# Patient Record
Sex: MALE | Race: ASIAN | NOT HISPANIC OR LATINO | Employment: FULL TIME | URBAN - METROPOLITAN AREA
[De-identification: names, ages, dates, MRNs, and addresses within clinical notes are randomized per-mention and may not be internally consistent; named-entity substitution may affect disease eponyms.]

---

## 2024-02-13 ENCOUNTER — APPOINTMENT (OUTPATIENT)
Dept: PRIMARY CARE | Facility: CLINIC | Age: 24
End: 2024-02-13

## 2024-03-13 ENCOUNTER — LAB (OUTPATIENT)
Dept: LAB | Facility: LAB | Age: 24
End: 2024-03-13
Payer: COMMERCIAL

## 2024-03-13 ENCOUNTER — OFFICE VISIT (OUTPATIENT)
Dept: PRIMARY CARE | Facility: CLINIC | Age: 24
End: 2024-03-13
Payer: COMMERCIAL

## 2024-03-13 VITALS
BODY MASS INDEX: 32.47 KG/M2 | OXYGEN SATURATION: 98 % | HEIGHT: 66 IN | HEART RATE: 78 BPM | DIASTOLIC BLOOD PRESSURE: 74 MMHG | SYSTOLIC BLOOD PRESSURE: 114 MMHG | WEIGHT: 202 LBS

## 2024-03-13 DIAGNOSIS — R19.7 DIARRHEA, UNSPECIFIED TYPE: Primary | ICD-10-CM

## 2024-03-13 DIAGNOSIS — R19.7 DIARRHEA, UNSPECIFIED TYPE: ICD-10-CM

## 2024-03-13 LAB
BASOPHILS # BLD AUTO: 0.02 X10*3/UL (ref 0–0.1)
BASOPHILS NFR BLD AUTO: 0.3 %
EOSINOPHIL # BLD AUTO: 0.1 X10*3/UL (ref 0–0.7)
EOSINOPHIL NFR BLD AUTO: 1.6 %
ERYTHROCYTE [DISTWIDTH] IN BLOOD BY AUTOMATED COUNT: 12.2 % (ref 11.5–14.5)
HCT VFR BLD AUTO: 47.6 % (ref 41–52)
HGB BLD-MCNC: 15.5 G/DL (ref 13.5–17.5)
IMM GRANULOCYTES # BLD AUTO: 0.02 X10*3/UL (ref 0–0.7)
IMM GRANULOCYTES NFR BLD AUTO: 0.3 % (ref 0–0.9)
LYMPHOCYTES # BLD AUTO: 1.83 X10*3/UL (ref 1.2–4.8)
LYMPHOCYTES NFR BLD AUTO: 28.9 %
MCH RBC QN AUTO: 27.5 PG (ref 26–34)
MCHC RBC AUTO-ENTMCNC: 32.6 G/DL (ref 32–36)
MCV RBC AUTO: 85 FL (ref 80–100)
MONOCYTES # BLD AUTO: 0.63 X10*3/UL (ref 0.1–1)
MONOCYTES NFR BLD AUTO: 10 %
NEUTROPHILS # BLD AUTO: 3.73 X10*3/UL (ref 1.2–7.7)
NEUTROPHILS NFR BLD AUTO: 58.9 %
NRBC BLD-RTO: 0 /100 WBCS (ref 0–0)
PLATELET # BLD AUTO: 283 X10*3/UL (ref 150–450)
RBC # BLD AUTO: 5.63 X10*6/UL (ref 4.5–5.9)
TSH SERPL-ACNC: 1.07 MIU/L (ref 0.44–3.98)
WBC # BLD AUTO: 6.3 X10*3/UL (ref 4.4–11.3)

## 2024-03-13 PROCEDURE — 84443 ASSAY THYROID STIM HORMONE: CPT

## 2024-03-13 PROCEDURE — 36415 COLL VENOUS BLD VENIPUNCTURE: CPT

## 2024-03-13 PROCEDURE — 80053 COMPREHEN METABOLIC PANEL: CPT

## 2024-03-13 PROCEDURE — 99214 OFFICE O/P EST MOD 30 MIN: CPT | Performed by: FAMILY MEDICINE

## 2024-03-13 PROCEDURE — 85025 COMPLETE CBC W/AUTO DIFF WBC: CPT

## 2024-03-13 PROCEDURE — 1036F TOBACCO NON-USER: CPT | Performed by: FAMILY MEDICINE

## 2024-03-13 RX ORDER — DEXTROMETHORPHAN HYDROBROMIDE, GUAIFENESIN 5; 100 MG/5ML; MG/5ML
650 LIQUID ORAL EVERY 8 HOURS PRN
COMMUNITY

## 2024-03-13 RX ORDER — DICYCLOMINE HYDROCHLORIDE 20 MG/1
20 TABLET ORAL
Qty: 20 TABLET | Refills: 1 | Status: SHIPPED | OUTPATIENT
Start: 2024-03-13 | End: 2024-03-18 | Stop reason: WASHOUT

## 2024-03-13 ASSESSMENT — PATIENT HEALTH QUESTIONNAIRE - PHQ9
SUM OF ALL RESPONSES TO PHQ9 QUESTIONS 1 AND 2: 0
2. FEELING DOWN, DEPRESSED OR HOPELESS: NOT AT ALL
1. LITTLE INTEREST OR PLEASURE IN DOING THINGS: NOT AT ALL

## 2024-03-13 ASSESSMENT — PAIN SCALES - GENERAL: PAINLEVEL: 0-NO PAIN

## 2024-03-13 ASSESSMENT — ENCOUNTER SYMPTOMS
RECTAL PAIN: 0
ANAL BLEEDING: 0
DIARRHEA: 1
VOMITING: 0
BLOOD IN STOOL: 0
ABDOMINAL PAIN: 1
ARTHRALGIAS: 0
NAUSEA: 0

## 2024-03-13 NOTE — PROGRESS NOTES
[facility]: [facility]  E/M EVALUATION    Erick Fry is a 23 y.o. male who presents for Abdominal Pain (Patient has been having a lot of bowel movements, uncontrollable, denies blood in the stool/dd).    Subjective   22 yo   Pt here for bowel concerns   stringly some times water 5-6 x per day. Thinks he has a hemorrhoid. Noticed a bulge on anus.  He states will have urgency.  No OTC.  No abd pain.  Had one episode a while back with blood on TP.      Abdominal Pain  Associated symptoms include diarrhea. Pertinent negatives include no arthralgias, nausea, rash or vomiting.     Review of Systems   Gastrointestinal:  Positive for abdominal pain and diarrhea. Negative for anal bleeding, blood in stool, nausea, rectal pain and vomiting.   Musculoskeletal:  Negative for arthralgias.   Skin:  Negative for rash.       Objective   Vitals:    03/13/24 1450   BP: 114/74   Pulse: 78   SpO2: 98%     Physical Exam  Constitutional:       Appearance: Normal appearance.   Cardiovascular:      Rate and Rhythm: Normal rate and regular rhythm.      Heart sounds: No murmur heard.  Pulmonary:      Effort: Pulmonary effort is normal.      Breath sounds: Normal breath sounds.   Abdominal:      General: Bowel sounds are normal.      Palpations: Abdomen is soft.      Tenderness: There is no abdominal tenderness.   Genitourinary:     Rectum: Normal.      Comments: NATHAN neg.   Neurological:      Mental Status: He is alert.         Assessment/Plan      There is no problem list on file for this patient.      Diagnoses and all orders for this visit:  Diarrhea, unspecified type  -     dicyclomine (Bentyl) 20 mg tablet; Take 1 tablet (20 mg) by mouth 4 times a day before meals for 10 days.  -     Comprehensive Metabolic Panel; Future  -     TSH with reflex to Free T4 if abnormal; Future  -     CBC and Auto Differential; Future      The patient was encouraged to ensure that any/all documentation is accurate and up to  date, and that our office be provided a copy in the event that anything changes.         Moe Hagen MD

## 2024-03-14 LAB
ALBUMIN SERPL BCP-MCNC: 4.9 G/DL (ref 3.4–5)
ALP SERPL-CCNC: 76 U/L (ref 33–120)
ALT SERPL W P-5'-P-CCNC: 64 U/L (ref 10–52)
ANION GAP SERPL CALC-SCNC: 14 MMOL/L (ref 10–20)
AST SERPL W P-5'-P-CCNC: 33 U/L (ref 9–39)
BILIRUB SERPL-MCNC: 0.6 MG/DL (ref 0–1.2)
BUN SERPL-MCNC: 17 MG/DL (ref 6–23)
CALCIUM SERPL-MCNC: 9.8 MG/DL (ref 8.6–10.6)
CHLORIDE SERPL-SCNC: 104 MMOL/L (ref 98–107)
CO2 SERPL-SCNC: 29 MMOL/L (ref 21–32)
CREAT SERPL-MCNC: 1.17 MG/DL (ref 0.5–1.3)
EGFRCR SERPLBLD CKD-EPI 2021: 90 ML/MIN/1.73M*2
GLUCOSE SERPL-MCNC: 84 MG/DL (ref 74–99)
POTASSIUM SERPL-SCNC: 4.7 MMOL/L (ref 3.5–5.3)
PROT SERPL-MCNC: 7.6 G/DL (ref 6.4–8.2)
SODIUM SERPL-SCNC: 142 MMOL/L (ref 136–145)

## 2024-03-18 ENCOUNTER — TELEPHONE (OUTPATIENT)
Dept: PRIMARY CARE | Facility: CLINIC | Age: 24
End: 2024-03-18
Payer: COMMERCIAL

## 2024-03-18 DIAGNOSIS — R19.7 DIARRHEA, UNSPECIFIED TYPE: Primary | ICD-10-CM

## 2024-03-18 RX ORDER — DIPHENOXYLATE HYDROCHLORIDE AND ATROPINE SULFATE 2.5; .025 MG/1; MG/1
1 TABLET ORAL 4 TIMES DAILY PRN
Qty: 30 TABLET | Refills: 0 | Status: SHIPPED | OUTPATIENT
Start: 2024-03-18 | End: 2024-04-02

## 2024-03-18 NOTE — TELEPHONE ENCOUNTER
You had given patient Dicyclomine at his recent appointment and he said since then he has been feeling alright. He is still having having frequent bowel movements, still has a slight urgency to go.

## 2024-03-18 NOTE — TELEPHONE ENCOUNTER
----- Message from Moe Hagen MD sent at 3/17/2024  6:32 PM EDT -----  One of the liver tests are slightly elevated, should be repeated in 6 months to see if back to normal. Other labs are good

## 2024-03-29 ENCOUNTER — TELEPHONE (OUTPATIENT)
Dept: PRIMARY CARE | Facility: CLINIC | Age: 24
End: 2024-03-29

## 2024-03-29 NOTE — TELEPHONE ENCOUNTER
Patient said he is still dealing with prolapsed hemmheroid and has since ran out of the medication that was given, would you refill this or is there something else he should be doing?

## 2024-03-31 DIAGNOSIS — K64.9 HEMORRHOIDS, UNSPECIFIED HEMORRHOID TYPE: Primary | ICD-10-CM

## 2024-03-31 RX ORDER — HYDROCORTISONE ACETATE 25 MG/1
25 SUPPOSITORY RECTAL 2 TIMES DAILY
Qty: 20 SUPPOSITORY | Refills: 0 | Status: SHIPPED | OUTPATIENT
Start: 2024-03-31 | End: 2024-04-10

## 2024-04-01 DIAGNOSIS — R19.7 DIARRHEA, UNSPECIFIED TYPE: ICD-10-CM

## 2024-04-01 NOTE — TELEPHONE ENCOUNTER
Patient said the Hydrocortisone cream you called in for him was rejected by his insurance because its not FDA approved. They are asking for alternative

## 2024-04-01 NOTE — TELEPHONE ENCOUNTER
Spoke with the Pt he verbally understands.Pt will  medication and wait for the referral dept to call and make appt.

## 2024-04-02 ENCOUNTER — OFFICE VISIT (OUTPATIENT)
Dept: SURGERY | Facility: CLINIC | Age: 24
End: 2024-04-02
Payer: COMMERCIAL

## 2024-04-02 VITALS
HEART RATE: 98 BPM | SYSTOLIC BLOOD PRESSURE: 124 MMHG | DIASTOLIC BLOOD PRESSURE: 68 MMHG | WEIGHT: 198 LBS | BODY MASS INDEX: 31.96 KG/M2

## 2024-04-02 DIAGNOSIS — K64.9 HEMORRHOIDS, UNSPECIFIED HEMORRHOID TYPE: ICD-10-CM

## 2024-04-02 PROCEDURE — 99203 OFFICE O/P NEW LOW 30 MIN: CPT | Performed by: SURGERY

## 2024-04-02 PROCEDURE — 99213 OFFICE O/P EST LOW 20 MIN: CPT | Performed by: SURGERY

## 2024-04-02 RX ORDER — DIPHENOXYLATE HYDROCHLORIDE AND ATROPINE SULFATE 2.5; .025 MG/1; MG/1
1 TABLET ORAL 4 TIMES DAILY PRN
Qty: 30 TABLET | Refills: 0 | Status: SHIPPED | OUTPATIENT
Start: 2024-04-02 | End: 2024-06-01

## 2024-04-02 ASSESSMENT — PAIN SCALES - GENERAL: PAINLEVEL: 0-NO PAIN

## 2024-04-02 NOTE — PROGRESS NOTES
History Of Present Illness  Erick Fry is a 23 y.o. male referred by Dr. Moe Hagen for evaluation of hemorrhoids.    Went to PCP on March 13, 2024 with complaints of diarrhea, urgency, and bulge at the anus.  One episode of blood noted on toilet paper.  Was prescribed Anusol suppositories and Lomotil.     Bowel issues past couple of months.  Does not feel like he completely empties.  Stools are pretty watery.  Denies abdominal pain and nausea  He believes he has a hemorrhoid.  Has had intermittent rectal bleeding on the toilet paper.  Two episodes in the past two months.  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  Denies weight loss.  Denies anal receptive intercourse, history of STIs STDs, recent travel, recent antibiotics.    Past Medical History      Surgical History       Social History  Smoking: Quit smoking  ETOH: Denies    Family History  No family history of colon cancer.  No family history of IBD.     Allergies  Penicillins and Shellfish containing products    Visit Vitals  /68 (BP Location: Right arm)   Pulse 98   Wt 89.8 kg (198 lb)   BMI 31.96 kg/m²   Smoking Status Never   BSA 2.04 m²         Review of Systems  Constitutional: Negative for fever, chills, anorexia, weight loss, malaise     ENMT: Negative for nasal discharge, congestion, ear pain, mouth pain, throat pain     Respiratory: Negative for cough, hemoptysis, wheezing, shortness of breath     Cardiac: Negative for chest pain, dyspnea on exertion, orthopnea, palpitations, syncope     Gastrointestinal: Negative for nausea, vomiting, diarrhea, constipation, abdominal pain,     Genitourinary: Negative for discharge, dysuria, flank pain, frequency, hematuria     Musculoskeletal: Negative for decreased ROM, pain, swelling, weakness     Neurological: Negative for dizziness, confusion,  headache, seizures, syncope     Psychiatric: Negative for mood changes, anxiety, hallucinations, sleep changes, suicidal ideas     Skin: Negative for mass, pain, itching, rash, ulcer     Endocrine: Negative for heat intolerance, cold intolerance, excessive sweating, polyuria, excess thirst     Hematologic/Lymph: Negative for anemia, bruising, easy bleeding, night sweats, petechiae, history of DVT/PE or cancer     Allergic/Immunologic: Negative for anaphylaxis, itchy/ teary eyes, itching, sneezing, swelling       Physical Exam  Constitutional:       Appearance: Normal appearance.   HENT:      Head: Normocephalic.   Eyes:      Pupils: Pupils are equal, round, and reactive to light.   Cardiovascular:      Rate and Rhythm: Normal rate.   Pulmonary:      Effort: Pulmonary effort is normal.   Abdominal:      General: Abdomen is flat. Bowel sounds are normal.      Palpations: Abdomen is soft.   Genitourinary:     Comments: Verbal consent was obtained and with chaperone present the patient was placed in prone Kraske position. Perianal skin was examined without abnormality.  No external hemorrhoids identified.  Digital rectal exam revealed normal tone with good squeeze.  No palpable masses appreciated.  Anoscope was inserted with enlarged inflamed grade 2 hemorrhoids with mucous, possible proctitis.  Relatively quickly removed as patient did not tolerate anoscopy.  Skin:     General: Skin is warm.   Neurological:      General: No focal deficit present.      Mental Status: He is alert.            Assessment/Plan   Problem List Items Addressed This Visit             ICD-10-CM       Gastrointestinal and Abdominal    Hemorrhoids K64.9     Inflamed grade 2 internal hemorrhoids, exam limited by patient tolerance.  Recommend initial nonoperative management including fiber supplementation, hydration, limiting straining and toilet time.  Should symptoms persist we will proceed with flexible sigmoidoscopy in the office to rule out  proctitis, STI panel, stool testing, possible hemorrhoid banding

## 2024-04-18 PROBLEM — N50.819 PAIN IN TESTICLE: Status: ACTIVE | Noted: 2024-04-18

## 2024-04-18 PROBLEM — M21.42 ACQUIRED PES PLANUS OF LEFT FOOT: Status: ACTIVE | Noted: 2024-04-18

## 2024-04-18 PROBLEM — M54.50 LOW BACK PAIN, UNSPECIFIED: Status: ACTIVE | Noted: 2024-04-18

## 2024-04-18 PROBLEM — J01.90 ACUTE SINUSITIS: Status: ACTIVE | Noted: 2024-04-18

## 2024-04-18 PROBLEM — M53.3 SACROILIAC JOINT DYSFUNCTION OF RIGHT SIDE: Status: ACTIVE | Noted: 2024-04-18

## 2024-04-18 PROBLEM — M41.9 SCOLIOSIS OF THORACIC SPINE: Status: ACTIVE | Noted: 2024-04-18

## 2024-04-18 PROBLEM — J30.2 SEASONAL ALLERGIES: Status: ACTIVE | Noted: 2024-04-18

## 2024-04-18 PROBLEM — M21.40 PES PLANUS: Status: ACTIVE | Noted: 2024-04-18

## 2024-04-18 PROBLEM — M25.78 OSTEOPHYTE OF VERTEBRAE: Status: ACTIVE | Noted: 2024-04-18

## 2024-04-18 PROBLEM — S39.012A STRAIN OF LUMBAR REGION: Status: ACTIVE | Noted: 2024-04-18

## 2024-04-18 PROBLEM — M99.03 SEGMENTAL AND SOMATIC DYSFUNCTION OF LUMBAR REGION: Status: ACTIVE | Noted: 2024-04-18

## 2024-04-18 PROBLEM — Q66.6 VALGUS DEFORMITY OF BOTH FEET: Status: ACTIVE | Noted: 2024-04-18

## 2024-04-18 PROBLEM — N45.2 ORCHITIS: Status: ACTIVE | Noted: 2024-04-18

## 2024-04-18 PROBLEM — M47.814 OSTEOARTHRITIS OF THORACIC SPINE: Status: ACTIVE | Noted: 2024-04-18

## 2024-04-18 PROBLEM — R29.3 ABNORMAL POSTURE: Status: ACTIVE | Noted: 2024-04-18

## 2024-04-18 PROBLEM — M50.30 OTHER CERVICAL DISC DEGENERATION, UNSPECIFIED CERVICAL REGION: Status: ACTIVE | Noted: 2024-04-18

## 2024-04-18 PROBLEM — M21.70 LEG LENGTH DISCREPANCY: Status: ACTIVE | Noted: 2024-04-18

## 2024-04-18 RX ORDER — FLUTICASONE PROPIONATE 50 MCG
SPRAY, SUSPENSION (ML) NASAL EVERY 24 HOURS
COMMUNITY
Start: 2023-03-02

## 2024-04-18 RX ORDER — CELECOXIB 200 MG/1
CAPSULE ORAL EVERY 24 HOURS
COMMUNITY
Start: 2022-10-18

## 2024-04-18 RX ORDER — IBUPROFEN 200 MG
TABLET ORAL EVERY 12 HOURS
COMMUNITY

## 2024-04-18 RX ORDER — FLUCONAZOLE 150 MG/1
TABLET ORAL
COMMUNITY
Start: 2019-01-02

## 2024-05-03 ENCOUNTER — APPOINTMENT (OUTPATIENT)
Dept: SURGERY | Facility: CLINIC | Age: 24
End: 2024-05-03
Payer: COMMERCIAL

## 2024-05-07 ENCOUNTER — APPOINTMENT (OUTPATIENT)
Dept: SURGERY | Facility: CLINIC | Age: 24
End: 2024-05-07
Payer: COMMERCIAL

## 2024-05-13 NOTE — PROGRESS NOTES
Erick Fry is a 23 year old male with history of rectal bleeding.    Bowel issues past couple of months.  Does not feel like he completely empties.  Stools are pretty watery.  Denies abdominal pain and nausea  He believes he has a hemorrhoid.  Has had intermittent rectal bleeding on the toilet paper.  Two episodes in the past two months.  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  Denies weight loss.  Denies anal receptive intercourse, history of STIs STDs, recent travel, recent antibiotics.    On exam he was noted to have Inflamed grade 2 internal hemorrhoids  Recommend initial nonoperative management including fiber supplementation, hydration, limiting straining and toilet time.  Should symptoms persist we will proceed with flexible sigmoidoscopy in the office to rule out proctitis, STI panel, stool testing, possible hemorrhoid banding     Patient returns to the office for sigmoidoscopy.      Review of Systems  Constitutional: Negative for fever, chills, anorexia, weight loss, malaise            ENMT: Negative for nasal discharge, congestion, ear pain, mouth pain, throat pain        Respiratory: Negative for cough, hemoptysis, wheezing, shortness of breath              Cardiac: Negative for chest pain, dyspnea on exertion, orthopnea, palpitations, syncope             Gastrointestinal: Negative for nausea, vomiting, diarrhea, constipation, abdominal pain,      Genitourinary: Negative for discharge, dysuria, flank pain, frequency, hematuria         Musculoskeletal: Negative for decreased ROM, pain, swelling, weakness        Neurological: Negative for dizziness, confusion, headache, seizures, syncope            Psychiatric: Negative for mood changes, anxiety, hallucinations, sleep changes, suicidal ideas               Skin: Negative for mass, pain, itching, rash,  ulcer              Endocrine: Negative for heat intolerance, cold intolerance, excessive sweating, polyuria, excess thirst             Hematologic/Lymph: Negative for anemia, bruising, easy bleeding, night sweats, petechiae, history of DVT/PE or cancer       Allergic/Immunologic: Negative for anaphylaxis, itchy/ teary eyes, itching, sneezing, swelling       Physical Exam    {Assess/Plan SmartLinks (Optional):19022}

## 2024-05-24 NOTE — PROGRESS NOTES
Erick Fry is a 23 year old male with history of rectal bleeding.    Bowel issues past couple of months.  Does not feel like he completely empties.  Stools are pretty watery.  Denies abdominal pain and nausea  He believes he has a hemorrhoid.  Has had intermittent rectal bleeding on the toilet paper.  Two episodes in the past two months.  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  Denies weight loss.  Denies anal receptive intercourse, history of STIs STDs, recent travel, recent antibiotics.    On exam he was noted to have Inflamed grade 2 internal hemorrhoids  Recommend initial nonoperative management including fiber supplementation, hydration, limiting straining and toilet time.  Should symptoms persist we will proceed with flexible sigmoidoscopy in the office to rule out proctitis, STI panel, stool testing, possible hemorrhoid banding     Patient returns to the office for follow up.  He is a little better since starting fiber.  He does have some prolapsing tissue.  Still very loose bowel movements  Denies mucous and blood.        Visit Vitals  /77 (BP Location: Left arm)   Pulse 77   Wt 88.9 kg (196 lb)   BMI 31.64 kg/m²   Smoking Status Never   BSA 2.03 m²         Review of Systems  Constitutional: Negative for fever, chills, anorexia, weight loss, malaise            ENMT: Negative for nasal discharge, congestion, ear pain, mouth pain, throat pain        Respiratory: Negative for cough, hemoptysis, wheezing, shortness of breath              Cardiac: Negative for chest pain, dyspnea on exertion, orthopnea, palpitations, syncope             Gastrointestinal: Negative for nausea, vomiting, diarrhea, constipation, abdominal pain,      Genitourinary: Negative for discharge, dysuria, flank pain, frequency, hematuria         Musculoskeletal: Negative for  decreased ROM, pain, swelling, weakness        Neurological: Negative for dizziness, confusion, headache, seizures, syncope            Psychiatric: Negative for mood changes, anxiety, hallucinations, sleep changes, suicidal ideas               Skin: Negative for mass, pain, itching, rash, ulcer              Endocrine: Negative for heat intolerance, cold intolerance, excessive sweating, polyuria, excess thirst             Hematologic/Lymph: Negative for anemia, bruising, easy bleeding, night sweats, petechiae, history of DVT/PE or cancer       Allergic/Immunologic: Negative for anaphylaxis, itchy/ teary eyes, itching, sneezing, swelling       Physical Exam  Constitutional:       Appearance: Normal appearance.   HENT:      Head: Normocephalic.   Eyes:      Pupils: Pupils are equal, round, and reactive to light.   Cardiovascular:      Rate and Rhythm: Normal rate.   Pulmonary:      Effort: Pulmonary effort is normal.   Abdominal:      General: Abdomen is flat. Bowel sounds are normal.      Palpations: Abdomen is soft.   Genitourinary:     Comments: Verbal consent was obtained and with chaperone present the patient was placed in prone Kraske position. Perianal skin was examined without abnormality.  No external hemorrhoids identified.  Digital rectal exam revealed hypertonic tone with good squeeze.  Mild tenderness to palpation on digital exam.  No palpable masses appreciated.  Anoscope was inserted with hypertrophied anal papilla right anterior quadrant.  Inflamed engorged distal rectal tissue with ongoing concern for proctitis versus inflamed hemorrhoids.  Given appearance flexible sigmoidoscopy was performed rectum appeared grossly normal without obvious proctitis.  Would not tolerate retroflexion.  No pathology seen up to 10 cm.  Anoscope was again performed and inflamed hemorrhoidal tissue in the right posterior quadrant was banded.  Patient did not tolerate banding and this was removed.    He was then examined  sitting on the commode with Valsalva and no prolapsing tissue was able to be seen.  Skin:     General: Skin is warm.   Neurological:      General: No focal deficit present.      Mental Status: He is alert.         Problem List Items Addressed This Visit    None  Visit Diagnoses         Codes    Proctitis    -  Primary K62.89          Grade 2 hemorrhoids with significant inflammation concerning for distal proctitis.  No evidence of rectal prolapse.  Did not tolerate banding in the office today.  Will need further blood and stool evaluation for inflammation.  Likely will require colonoscopy in the near future.  Previously prescribed Anusol suppositories which were not covered by insurance.

## 2024-05-28 ENCOUNTER — LAB (OUTPATIENT)
Dept: LAB | Facility: LAB | Age: 24
End: 2024-05-28
Payer: COMMERCIAL

## 2024-05-28 ENCOUNTER — APPOINTMENT (OUTPATIENT)
Dept: SURGERY | Facility: CLINIC | Age: 24
End: 2024-05-28
Payer: COMMERCIAL

## 2024-05-28 ENCOUNTER — OFFICE VISIT (OUTPATIENT)
Dept: SURGERY | Facility: CLINIC | Age: 24
End: 2024-05-28
Payer: COMMERCIAL

## 2024-05-28 VITALS
BODY MASS INDEX: 31.64 KG/M2 | SYSTOLIC BLOOD PRESSURE: 122 MMHG | DIASTOLIC BLOOD PRESSURE: 77 MMHG | WEIGHT: 196 LBS | HEART RATE: 77 BPM

## 2024-05-28 DIAGNOSIS — K62.5 RECTAL BLEEDING: ICD-10-CM

## 2024-05-28 DIAGNOSIS — K62.89 RECTAL PAIN: ICD-10-CM

## 2024-05-28 DIAGNOSIS — K62.89 PROCTITIS: Primary | ICD-10-CM

## 2024-05-28 LAB
CRP SERPL-MCNC: <0.1 MG/DL
ERYTHROCYTE [DISTWIDTH] IN BLOOD BY AUTOMATED COUNT: 11.9 % (ref 11.5–14.5)
ERYTHROCYTE [SEDIMENTATION RATE] IN BLOOD BY WESTERGREN METHOD: <1 MM/H (ref 0–15)
HCT VFR BLD AUTO: 43.6 % (ref 41–52)
HGB BLD-MCNC: 15 G/DL (ref 13.5–17.5)
MCH RBC QN AUTO: 28.8 PG (ref 26–34)
MCHC RBC AUTO-ENTMCNC: 34.4 G/DL (ref 32–36)
MCV RBC AUTO: 84 FL (ref 80–100)
NRBC BLD-RTO: 0 /100 WBCS (ref 0–0)
PLATELET # BLD AUTO: 259 X10*3/UL (ref 150–450)
RBC # BLD AUTO: 5.21 X10*6/UL (ref 4.5–5.9)
WBC # BLD AUTO: 9.2 X10*3/UL (ref 4.4–11.3)

## 2024-05-28 PROCEDURE — 85652 RBC SED RATE AUTOMATED: CPT

## 2024-05-28 PROCEDURE — 85027 COMPLETE CBC AUTOMATED: CPT

## 2024-05-28 PROCEDURE — 99213 OFFICE O/P EST LOW 20 MIN: CPT | Mod: 25 | Performed by: SURGERY

## 2024-05-28 PROCEDURE — 87591 N.GONORRHOEAE DNA AMP PROB: CPT

## 2024-05-28 PROCEDURE — 86631 CHLAMYDIA ANTIBODY: CPT

## 2024-05-28 PROCEDURE — 86140 C-REACTIVE PROTEIN: CPT

## 2024-05-28 PROCEDURE — 45330 DIAGNOSTIC SIGMOIDOSCOPY: CPT | Performed by: SURGERY

## 2024-05-28 PROCEDURE — 87328 CRYPTOSPORIDIUM AG IA: CPT

## 2024-05-28 PROCEDURE — 87329 GIARDIA AG IA: CPT

## 2024-05-28 PROCEDURE — 87491 CHLMYD TRACH DNA AMP PROBE: CPT

## 2024-05-28 PROCEDURE — 99213 OFFICE O/P EST LOW 20 MIN: CPT | Performed by: SURGERY

## 2024-05-28 PROCEDURE — 83993 ASSAY FOR CALPROTECTIN FECAL: CPT

## 2024-05-28 PROCEDURE — 36415 COLL VENOUS BLD VENIPUNCTURE: CPT

## 2024-05-28 ASSESSMENT — PAIN SCALES - GENERAL: PAINLEVEL: 2

## 2024-05-29 LAB
C TRACH RRNA SPEC QL NAA+PROBE: NEGATIVE
N GONORRHOEA DNA SPEC QL PROBE+SIG AMP: NEGATIVE

## 2024-05-31 LAB
C PNEUM IGG TITR SER IF: NORMAL {TITER}
C PSITTACI IGG TITR SER IF: NORMAL {TITER}
C TRACH IGG TITR SER IF: NORMAL {TITER}
CALPROTECTIN STL-MCNT: 74 UG/G

## 2024-06-02 LAB — O+P STL MICRO: NEGATIVE

## 2024-06-11 DIAGNOSIS — K62.89 PROCTITIS: ICD-10-CM

## 2024-08-07 NOTE — PROGRESS NOTES
"  Erick Fry is a 24 y.o. male who is referred by Dr. Sincere Tyler for proctitis. He was seen by [facility] for inflamed hemorrhoids, straining, intermittent bleeding, and diarrhea. He had non-operative management including fiber supplementation, hydration, and limiting straining. This helped some but he continued to have issues.     Flexible sigmoidoscopy earlier this Summer showed significant inflammation concerning for distal proctitis. No specimens collected. Did not tolerate banding in office. Prescribed Anusol suppositories which were not covered by insurance.    5/2024 Fecal calprotectin 74. CRP and sed rate normal. TSH normal.    He reports a 6 month history of diarrhea. He was having 3-4 episodes per day however since taking fiber supplement this has improved some, now once or twice. A few months ago saw blood when wiping 3 or 4 times but none recently. Has abdominal cramping after certain things like fast food or carbonated drinks. No nausea, vomiting, or unintentional weight loss. He does not take NSAIDS regularly.    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 Vapes nicotine occasionally. This is sporadic. Infrequent alcohol, may go months without a drink. Denies illicit drugs.    Family history: Denies family history of ulcers, IBD, autoimmune disorders, colon cancer or other GI disorders or malignancy.       Current Outpatient Medications   Medication Sig Dispense Refill    acetaminophen (Tylenol 8 HOUR) 650 mg ER tablet Take 1 tablet (650 mg) by mouth every 8 hours if needed for mild pain (1 - 3). Do not crush, chew, or split.       No current facility-administered medications for this visit.     Review of Systems  Review of Systems negative except as noted in HPI.    Objective     /79   Pulse 84   Temp 37.2 °C (99 °F)   Ht 1.676 m (5' 6\")   Wt 87.1 kg (192 lb)   BMI 30.99 kg/m²      Physical Exam  Constitutional:  No acute distress. Normal appearance. Not " ill-appearing.  HENT:  Head normocephalic and atraumatic. Conjunctivae normal.  Cardiovascular:  Normal rate. Regular rhythm.  Pulmonary:  Pulmonary effort normal. No respiratory distress. Breath sounds clear.  Abdominal:  Abdomen is flat and soft. There is no distension. No tenderness or guarding.  Skin: Dry.  Neurological:  Alert and oriented.  Psychiatric:  Mood and affect normal.    Assessment/Plan     24 y.o. male who presents today for initial clinic visit for proctitis. Reports 6 month history of diarrhea with intermittent small volume rectal bleeding. Fecal calprotectin 74. Flex sigmoidoscopy with Colorectal concerning for proctitis. No specimens collected. Did not tolerate banding. Will need further evaluation with repeat fecal calprotectin and colonoscopy.    Recommendations  Continue fiber supplement daily.  Repeat fecal calprotectin.  Schedule colonoscopy. Discussed procedure and Miralax prep.  Follow up after procedure.    Electronically signed by: Natalie Gill CNP on 8/15/2024 at 2:35 PM

## 2024-08-15 ENCOUNTER — OFFICE VISIT (OUTPATIENT)
Dept: GASTROENTEROLOGY | Facility: CLINIC | Age: 24
End: 2024-08-15
Payer: COMMERCIAL

## 2024-08-15 VITALS
SYSTOLIC BLOOD PRESSURE: 137 MMHG | TEMPERATURE: 99 F | DIASTOLIC BLOOD PRESSURE: 79 MMHG | HEIGHT: 66 IN | HEART RATE: 84 BPM | WEIGHT: 192 LBS | BODY MASS INDEX: 30.86 KG/M2

## 2024-08-15 DIAGNOSIS — R19.7 DIARRHEA, UNSPECIFIED TYPE: ICD-10-CM

## 2024-08-15 DIAGNOSIS — K62.89 PROCTITIS: Primary | ICD-10-CM

## 2024-08-15 DIAGNOSIS — R19.5 ELEVATED FECAL CALPROTECTIN: ICD-10-CM

## 2024-08-15 PROCEDURE — 99214 OFFICE O/P EST MOD 30 MIN: CPT | Performed by: NURSE PRACTITIONER

## 2024-08-15 PROCEDURE — 3008F BODY MASS INDEX DOCD: CPT | Performed by: NURSE PRACTITIONER

## 2024-08-15 PROCEDURE — 99204 OFFICE O/P NEW MOD 45 MIN: CPT | Performed by: NURSE PRACTITIONER

## 2024-08-15 NOTE — PATIENT INSTRUCTIONS
Recommendations  Continue fiber supplement daily.  Repeat fecal calprotectin.  Schedule colonoscopy. You can call [phone] to schedule. Make sure to ask for Miralax prep instructions.  Follow up 2 weeks after procedure.

## 2024-08-16 ENCOUNTER — LAB (OUTPATIENT)
Dept: LAB | Facility: LAB | Age: 24
End: 2024-08-16
Payer: COMMERCIAL

## 2024-08-16 DIAGNOSIS — R19.7 DIARRHEA, UNSPECIFIED TYPE: ICD-10-CM

## 2024-08-16 DIAGNOSIS — K62.89 PROCTITIS: ICD-10-CM

## 2024-08-16 PROCEDURE — 83993 ASSAY FOR CALPROTECTIN FECAL: CPT

## 2024-08-20 LAB — CALPROTECTIN STL-MCNT: 27 UG/G

## 2024-08-22 DIAGNOSIS — K62.89 ANAL OR RECTAL PAIN: Primary | ICD-10-CM

## 2024-08-22 DIAGNOSIS — Z12.11 COLON CANCER SCREENING: ICD-10-CM

## 2024-08-23 RX ORDER — POLYETHYLENE GLYCOL 3350, SODIUM SULFATE ANHYDROUS, SODIUM BICARBONATE, SODIUM CHLORIDE, POTASSIUM CHLORIDE 236; 22.74; 6.74; 5.86; 2.97 G/4L; G/4L; G/4L; G/4L; G/4L
POWDER, FOR SOLUTION ORAL
Qty: 4000 ML | Refills: 0 | Status: SHIPPED | OUTPATIENT
Start: 2024-08-23

## 2024-11-26 ENCOUNTER — DOCUMENTATION (OUTPATIENT)
Dept: PHYSICAL THERAPY | Facility: CLINIC | Age: 24
End: 2024-11-26
Payer: COMMERCIAL

## 2024-11-26 NOTE — PROGRESS NOTES
Physical Therapy Discharge Report    Patient Name: Erick Fry  MRN: [id number]  Today's Date: 11/26/2024    Subjective   Patient's response to therapy: final status unknown.          Additional Discharge Considerations: DC from PT

## 2025-01-21 ENCOUNTER — HOSPITAL ENCOUNTER (OUTPATIENT)
Dept: OPERATING ROOM | Facility: CLINIC | Age: 25
Setting detail: OUTPATIENT SURGERY
Discharge: HOME | End: 2025-01-21
Payer: COMMERCIAL

## 2025-01-21 VITALS
SYSTOLIC BLOOD PRESSURE: 126 MMHG | HEART RATE: 88 BPM | OXYGEN SATURATION: 96 % | RESPIRATION RATE: 16 BRPM | DIASTOLIC BLOOD PRESSURE: 68 MMHG | TEMPERATURE: 97.5 F

## 2025-01-21 DIAGNOSIS — K62.89 PROCTITIS: Primary | ICD-10-CM

## 2025-01-21 DIAGNOSIS — R19.7 DIARRHEA, UNSPECIFIED TYPE: ICD-10-CM

## 2025-01-21 DIAGNOSIS — K62.5 RECTAL BLEEDING: ICD-10-CM

## 2025-01-21 DIAGNOSIS — R19.5 ELEVATED FECAL CALPROTECTIN: ICD-10-CM

## 2025-01-21 PROCEDURE — 99152 MOD SED SAME PHYS/QHP 5/>YRS: CPT | Performed by: INTERNAL MEDICINE

## 2025-01-21 PROCEDURE — 2500000004 HC RX 250 GENERAL PHARMACY W/ HCPCS (ALT 636 FOR OP/ED): Performed by: INTERNAL MEDICINE

## 2025-01-21 PROCEDURE — 7100000010 HC PHASE TWO TIME - EACH INCREMENTAL 1 MINUTE

## 2025-01-21 PROCEDURE — 3700000012 HC SEDATION LEVEL 5+ TIME - INITIAL 15 MINUTES 5/> YEARS

## 2025-01-21 PROCEDURE — 3600000007 HC OR TIME - EACH INCREMENTAL 1 MINUTE - PROCEDURE LEVEL TWO

## 2025-01-21 PROCEDURE — 7100000009 HC PHASE TWO TIME - INITIAL BASE CHARGE

## 2025-01-21 PROCEDURE — 45378 DIAGNOSTIC COLONOSCOPY: CPT | Performed by: INTERNAL MEDICINE

## 2025-01-21 PROCEDURE — 3600000002 HC OR TIME - INITIAL BASE CHARGE - PROCEDURE LEVEL TWO

## 2025-01-21 RX ORDER — FENTANYL CITRATE 50 UG/ML
INJECTION, SOLUTION INTRAMUSCULAR; INTRAVENOUS AS NEEDED
Status: COMPLETED | OUTPATIENT
Start: 2025-01-21 | End: 2025-01-21

## 2025-01-21 RX ORDER — MIDAZOLAM HYDROCHLORIDE 1 MG/ML
INJECTION, SOLUTION INTRAMUSCULAR; INTRAVENOUS AS NEEDED
Status: COMPLETED | OUTPATIENT
Start: 2025-01-21 | End: 2025-01-21

## 2025-01-21 RX ADMIN — MIDAZOLAM HYDROCHLORIDE 2 MG: 1 INJECTION, SOLUTION INTRAMUSCULAR; INTRAVENOUS at 09:04

## 2025-01-21 RX ADMIN — FENTANYL CITRATE 50 MCG: 50 INJECTION, SOLUTION INTRAMUSCULAR; INTRAVENOUS at 09:06

## 2025-01-21 RX ADMIN — MIDAZOLAM HYDROCHLORIDE 2 MG: 1 INJECTION, SOLUTION INTRAMUSCULAR; INTRAVENOUS at 09:06

## 2025-01-21 RX ADMIN — FENTANYL CITRATE 50 MCG: 50 INJECTION, SOLUTION INTRAMUSCULAR; INTRAVENOUS at 09:04

## 2025-01-21 ASSESSMENT — COLUMBIA-SUICIDE SEVERITY RATING SCALE - C-SSRS
2. HAVE YOU ACTUALLY HAD ANY THOUGHTS OF KILLING YOURSELF?: NO
1. IN THE PAST MONTH, HAVE YOU WISHED YOU WERE DEAD OR WISHED YOU COULD GO TO SLEEP AND NOT WAKE UP?: NO
6. HAVE YOU EVER DONE ANYTHING, STARTED TO DO ANYTHING, OR PREPARED TO DO ANYTHING TO END YOUR LIFE?: NO

## 2025-01-21 ASSESSMENT — PAIN SCALES - GENERAL
PAINLEVEL_OUTOF10: 0 - NO PAIN

## 2025-01-21 ASSESSMENT — PAIN - FUNCTIONAL ASSESSMENT: PAIN_FUNCTIONAL_ASSESSMENT: 0-10

## 2025-01-21 NOTE — H&P
History Of Present Illness  Erick Fry is a 24 y.o. male presenting with diarrhea and rectal bleeding for colonoscopy.     Past Medical History  History reviewed. No pertinent past medical history.  Surgical History  History reviewed. No pertinent surgical history.  Social History  He reports that he has never smoked. He has never been exposed to tobacco smoke. He has never used smokeless tobacco. He reports that he does not drink alcohol and does not use drugs.    Family History  No family history on file.     Allergies  Allergies   Allergen Reactions    Penicillins Anaphylaxis    Shellfish Containing Products Other     Review of Systems  A 10+ point review of systems was completed and otherwise negative.    Pre-sedation Evaluation:  ASA Classification - ASA 2 - Patient with mild systemic disease with no functional limitations  Mallampati Score - I (soft palate, uvula, fauces, and tonsillar pillars visible)    Physical Exam  CONSTITUTIONAL: no acute distress, appears stated age  PULMONARY: clear to auscultation bilaterally  CARDIOVASCULAR: regular rate and rhythm  ABDOMEN: soft, non-tender  NEUROLOGIC: alert and oriented to person/place/time       Last Recorded Vitals  Blood pressure 156/70, pulse 85, temperature 36.7 °C (98.1 °F), temperature source Temporal, resp. rate 16, SpO2 97%.     Assessment/Plan   Will proceed with colonoscopy.     PTA/Current Medications:  (Not in a hospital admission)    Current Outpatient Medications   Medication Sig Dispense Refill    acetaminophen (Tylenol 8 HOUR) 650 mg ER tablet Take 1 tablet (650 mg) by mouth every 8 hours if needed for mild pain (1 - 3). Do not crush, chew, or split.      polyethylene glycol (Golytely) 236-22.74-6.74 -5.86 gram solution STARTING AT 6PM DRINK HALF OF THE BOTTLE, DRINK THE OTHER HALF 5 HRS BEFORE PROCEDURE TIME 4000 mL 0     No current facility-administered medications for this encounter.     Shivam Nation MD

## 2025-01-21 NOTE — DISCHARGE INSTRUCTIONS
Patient Instructions after a Colonoscopy      The anesthetics, sedatives or narcotics which were given to you today will be acting in your body for the next 24 hours, so you might feel a little sleepy or groggy.  This feeling should slowly wear off. Carefully read and follow the instructions.     You received sedation today:  - Do not drive or operate any machinery or power tools of any kind.   - No alcoholic beverages today, not even beer or wine.  - Do not make any important decisions or sign any legal documents.  - No over the counter medications that contain alcohol or that may cause drowsiness.  - Do not make any important decisions or sign any legal documents.    While it is common to experience mild to moderate abdominal distention, gas, or belching after your procedure, if any of these symptoms occur following discharge from the GI Lab or within one week of having your procedure, call the [facility] to be advised whether a visit to your nearest Urgent Care or Emergency Department is indicated.  Take this paper with you if you go.     - If you develop an allergic reaction to the medications that were given during your procedure such as difficulty breathing, rash, hives, severe nausea, vomiting or lightheadedness.  - If you experience chest pain, shortness of breath, severe abdominal pain, fevers and chills.  -If you develop signs and symptoms of bleeding such as blood in your spit, if your stools turn black, tarry, or bloody  - If you have not urinated within 8 hours following your procedure.  - If your IV site becomes painful, red, inflamed, or looks infected.    If you received a biopsy/polypectomy/sphincterotomy the following instructions apply below:    __ Do not use Aspirin containing products, non-steroidal medications or anti-coagulants for one week following your procedure. (Examples of these types of medications are: Advil, Arthrotec, Aleve, Coumadin, Ecotrin, Heparin, Ibuprofen,  Indocin, Motrin, Naprosyn, Nuprin, Plavix, Vioxx, and Voltarin, or their generic forms.  This list is not all-inclusive.  Check with your physician or pharmacist before resuming medications.)   __ Eat a soft diet today.  Avoid foods that are poorly digested for the next 24 hours.  These foods would include: nuts, beans, lettuce, red meats, and fried foods. Start with liquids and advance your diet as tolerated, gradually work up to eating solids.   __ Do not have a Barium Study or Enema for one week.    Your physician recommends the additional following instructions:    -You have a contact number available for emergencies. The signs and symptoms of potential delayed complications were discussed with you. You may return to normal activities tomorrow.  -Resume your previous diet.  -Continue your present medications.   -We are waiting for your pathology results.  -Your physician has recommended a repeat colonoscopy (date to be determined after pending pathology results are reviewed) for surveillance based on pathology results.  -The findings and recommendations have been discussed with you.  -The findings and recommendations were discussed with your family.  - Please see Medication Reconciliation Form for new medication/medications prescribed.       If you experience any problems or have any questions following discharge from the GI Lab, please call: [phone]

## 2025-01-22 ASSESSMENT — PAIN SCALES - GENERAL: PAINLEVEL_OUTOF10: 0 - NO PAIN

## 2025-05-18 ENCOUNTER — OFFICE VISIT (OUTPATIENT)
Dept: URGENT CARE | Age: 25
End: 2025-05-18
Payer: COMMERCIAL

## 2025-05-18 VITALS
HEART RATE: 81 BPM | TEMPERATURE: 98.1 F | WEIGHT: 197 LBS | DIASTOLIC BLOOD PRESSURE: 70 MMHG | BODY MASS INDEX: 30.92 KG/M2 | OXYGEN SATURATION: 100 % | RESPIRATION RATE: 16 BRPM | SYSTOLIC BLOOD PRESSURE: 165 MMHG | HEIGHT: 67 IN

## 2025-05-18 DIAGNOSIS — R53.83 FATIGUE, UNSPECIFIED TYPE: ICD-10-CM

## 2025-05-18 DIAGNOSIS — R42 ORTHOSTATIC LIGHTHEADEDNESS: Primary | ICD-10-CM

## 2025-05-18 DIAGNOSIS — R51.9 NONINTRACTABLE EPISODIC HEADACHE, UNSPECIFIED HEADACHE TYPE: ICD-10-CM

## 2025-05-18 LAB
POC CORONAVIRUS SARS-COV-2 PCR: NEGATIVE
POC HUMAN RHINOVIRUS PCR: NEGATIVE
POC INFLUENZA A VIRUS PCR: NEGATIVE
POC INFLUENZA B VIRUS PCR: NEGATIVE
POC RESPIRATORY SYNCYTIAL VIRUS PCR: NEGATIVE

## 2025-05-18 ASSESSMENT — PAIN SCALES - GENERAL: PAINLEVEL_OUTOF10: 0-NO PAIN

## 2025-05-18 ASSESSMENT — ENCOUNTER SYMPTOMS
HEADACHES: 1
LIGHT-HEADEDNESS: 1
FATIGUE: 1

## 2025-05-18 NOTE — PROGRESS NOTES
Subjective   Patient ID: Erick Fry is a 24 y.o. male. They present today with a chief complaint of Dizziness (Patient states that after he was done playing video games last night for several hours he got dizzy when he stood up. He states his balance was off like he couldn't walk straight not like the room was spinning. So he went to sleep and woke up this morning still dizzy feeling. States that its like tightness in head and having trouble walking straight.).    History of Present Illness  Patient is a 24-year-old male presents today with new onset of postural lightheadedness started last night after sitting and playing videogames for hours with some imbalance and brain fogginess, and right-sided unilateral headache that started today.  He reports fatigue.  Patient denies pre-existing chronic medical condition including hypertension, migraines, or recent head injury.  He reports concussion in the past as a child.  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  He denies dizziness with symptoms vertigo, syncope, visual changes or disturbances, hearing issues or muffled hearing, nausea, or vomiting.  He denies symptoms of upper respiratory infection.      Past Medical History  Allergies as of 05/18/2025 - Reviewed 05/18/2025   Allergen Reaction Noted    Penicillins Anaphylaxis 03/13/2024    Shellfish containing products Other 03/13/2024       Prescriptions Prior to Admission[1]     Medical History[2]    Surgical History[3]     reports that he has never smoked. He has never been exposed to tobacco smoke. He has never used smokeless tobacco. He reports that he does not drink alcohol and does not use drugs.    Review of Systems  Review of Systems   Constitutional:  Positive for fatigue.   Neurological:  Positive for light-headedness and headaches.                                  Objective    Vitals:    05/18/25 1600 05/18/25 1610 05/18/25 1616   BP: 137/69 138/60 165/70   BP Location: Left arm  "Left arm Left arm   Patient Position: Sitting Lying Standing   Pulse: 81     Resp: 16     Temp: 36.7 °C (98.1 °F)     TempSrc: Oral     SpO2: 100%     Weight: 89.4 kg (197 lb)     Height: 1.702 m (5' 7\")       No LMP for male patient.    Physical Exam    ECG 12 lead (Clinic Performed)    Date/Time: 5/18/2025 5:24 PM    Performed by: CHRISTIAN Webster  Authorized by: CHRISTIAN Webster    ECG interpreted by ED Physician in the absence of a cardiologist: yes    Previous ECG:     Previous ECG:  Unavailable  Interpretation:     Interpretation: normal    Quality:     Tracing quality:  Limited by artifact  Rate:     ECG rate assessment: normal    Rhythm:     Rhythm: sinus rhythm    QRS:     QRS axis:  Normal  ST segments:     ST segments:  Normal  Comments:      HR 64 bpm, SA, QRS 100ms,       Point of Care Test & Imaging Results from this visit  Results for orders placed or performed in visit on 05/18/25   POCT SPOTFIRE R/ST Panel Mini w/COVID ([facility]) manually resulted    Specimen: Swab   Result Value Ref Range    POC Sars-Cov-2 PCR Negative Negative    POC Respiratory Syncytial Virus PCR Negative Negative    POC Influenza A Virus PCR Negative Negative    POC Influenza B Virus PCR Negative Negative    POC Human Rhinovirus PCR Negative Negative      Imaging  No results found.    Cardiology, Vascular, and Other Imaging  No other imaging results found for the past 2 days      Diagnostic study results (if any) were reviewed by CHRISTIAN Webster.    Assessment/Plan   Allergies, medications, history, and pertinent labs/EKGs/Imaging reviewed by CHRISTIAN Webster.     Medical Decision Making  Patient is alert and oriented x 3 no acute distress.  Presents with new onset of lightheadedness with position change resulting in poor balance, right sided unilateral headache that took place on the way to urgent care today.  Spot for COVID is completed in the office to rule out patient's symptoms associated " with possible viral infection.  And is negative for viral presentation.  EKG is completed in office to rule out cardiovascular presentation to patient's symptoms and is within normal limits.  Orthostatic blood pressures completed in the office with no drop in systolic blood pressure.  Neurovascular examination was completed in office today and was negative for pathological findings.  Visual exam is within normal limits with normal visual field examination.  No aphasia, normal strength and sensory exam. Normal gait exam.  Patient is advised to follow-up with his primary care for further evaluation and management.  Patient was advised to proceed to emergency with ongoing and worsening symptoms suggestive of mental status change, visual changes, worsening lightheadedness or syncopal episodes, worsening headache unrelieved with over-the-counter pain medications. Patient verbalized understanding and agreed with the plan.  Patient left urgent care without issues in a stable condition.        Orders and Diagnoses  Diagnoses and all orders for this visit:  Orthostatic lightheadedness  -     POCT SPOTFIRE R/ST Panel Mini w/COVID ([facility]) manually resulted  -     ECG 12 lead (Clinic Performed)  Nonintractable episodic headache, unspecified headache type  -     POCT SPOTFIRE R/ST Panel Mini w/COVID ([facility]) manually resulted  Fatigue, unspecified type  -     POCT SPOTFIRE R/ST Panel Mini w/COVID ([facility]) manually resulted      Medical Admin Record      Patient disposition: Home    Electronically signed by CHRISTIAN Webster  7:02 PM           [1] (Not in a hospital admission)   [2] History reviewed. No pertinent past medical history.  [3] History reviewed. No pertinent surgical history.

## 2025-08-20 ENCOUNTER — OFFICE VISIT (OUTPATIENT)
Dept: URGENT CARE | Age: 25
End: 2025-08-20
Payer: COMMERCIAL

## 2025-08-20 VITALS
HEART RATE: 88 BPM | HEIGHT: 67 IN | TEMPERATURE: 99 F | WEIGHT: 197 LBS | OXYGEN SATURATION: 99 % | BODY MASS INDEX: 30.92 KG/M2 | SYSTOLIC BLOOD PRESSURE: 138 MMHG | DIASTOLIC BLOOD PRESSURE: 84 MMHG

## 2025-08-20 DIAGNOSIS — S39.012A STRAIN OF LUMBAR REGION, INITIAL ENCOUNTER: Primary | ICD-10-CM

## 2025-08-20 DIAGNOSIS — M54.50 ACUTE BILATERAL LOW BACK PAIN WITHOUT SCIATICA: ICD-10-CM

## 2025-08-20 PROCEDURE — 99213 OFFICE O/P EST LOW 20 MIN: CPT | Performed by: PHYSICIAN ASSISTANT

## 2025-08-20 PROCEDURE — 3008F BODY MASS INDEX DOCD: CPT | Performed by: PHYSICIAN ASSISTANT

## 2025-08-20 RX ORDER — PREDNISONE 20 MG/1
40 TABLET ORAL DAILY
Qty: 10 TABLET | Refills: 0 | Status: SHIPPED | OUTPATIENT
Start: 2025-08-20 | End: 2025-08-25

## 2025-08-20 RX ORDER — METHOCARBAMOL 500 MG/1
500 TABLET, FILM COATED ORAL 3 TIMES DAILY
Qty: 12 TABLET | Refills: 0 | Status: SHIPPED | OUTPATIENT
Start: 2025-08-20 | End: 2025-08-24

## 2025-08-20 ASSESSMENT — ENCOUNTER SYMPTOMS: BACK PAIN: 1

## 2025-08-27 ENCOUNTER — ANCILLARY PROCEDURE (OUTPATIENT)
Dept: URGENT CARE | Age: 25
End: 2025-08-27
Payer: COMMERCIAL

## 2025-08-27 ENCOUNTER — OFFICE VISIT (OUTPATIENT)
Dept: URGENT CARE | Age: 25
End: 2025-08-27
Payer: COMMERCIAL

## 2025-08-27 VITALS
OXYGEN SATURATION: 99 % | BODY MASS INDEX: 31.66 KG/M2 | SYSTOLIC BLOOD PRESSURE: 127 MMHG | DIASTOLIC BLOOD PRESSURE: 81 MMHG | WEIGHT: 197 LBS | HEIGHT: 66 IN | TEMPERATURE: 98.2 F | HEART RATE: 71 BPM

## 2025-08-27 DIAGNOSIS — M54.42 ACUTE LOW BACK PAIN WITH BILATERAL SCIATICA, UNSPECIFIED BACK PAIN LATERALITY: Primary | ICD-10-CM

## 2025-08-27 DIAGNOSIS — M54.41 ACUTE LOW BACK PAIN WITH BILATERAL SCIATICA, UNSPECIFIED BACK PAIN LATERALITY: ICD-10-CM

## 2025-08-27 DIAGNOSIS — M54.42 ACUTE LOW BACK PAIN WITH BILATERAL SCIATICA, UNSPECIFIED BACK PAIN LATERALITY: ICD-10-CM

## 2025-08-27 DIAGNOSIS — M54.41 ACUTE LOW BACK PAIN WITH BILATERAL SCIATICA, UNSPECIFIED BACK PAIN LATERALITY: Primary | ICD-10-CM

## 2025-08-27 PROCEDURE — 3008F BODY MASS INDEX DOCD: CPT | Performed by: PHYSICIAN ASSISTANT

## 2025-08-27 PROCEDURE — 99213 OFFICE O/P EST LOW 20 MIN: CPT | Performed by: PHYSICIAN ASSISTANT

## 2025-08-27 PROCEDURE — 72100 X-RAY EXAM L-S SPINE 2/3 VWS: CPT | Performed by: PHYSICIAN ASSISTANT

## 2025-08-27 PROCEDURE — 96372 THER/PROPH/DIAG INJ SC/IM: CPT | Performed by: PHYSICIAN ASSISTANT

## 2025-08-27 RX ORDER — GABAPENTIN 300 MG/1
300 CAPSULE ORAL 3 TIMES DAILY
Qty: 15 CAPSULE | Refills: 0 | Status: SHIPPED | OUTPATIENT
Start: 2025-08-27 | End: 2025-09-01

## 2025-08-27 RX ORDER — DICLOFENAC SODIUM 10 MG/G
4 GEL TOPICAL 4 TIMES DAILY
Qty: 160 G | Refills: 0 | Status: SHIPPED | OUTPATIENT
Start: 2025-08-27 | End: 2025-09-06

## 2025-08-27 RX ORDER — KETOROLAC TROMETHAMINE 30 MG/ML
30 INJECTION, SOLUTION INTRAMUSCULAR; INTRAVENOUS ONCE
Status: COMPLETED | OUTPATIENT
Start: 2025-08-27 | End: 2025-08-27

## 2025-08-27 RX ADMIN — KETOROLAC TROMETHAMINE 30 MG: 30 INJECTION, SOLUTION INTRAMUSCULAR; INTRAVENOUS at 14:11

## 2025-08-27 ASSESSMENT — ENCOUNTER SYMPTOMS: BACK PAIN: 1

## 2025-09-02 ENCOUNTER — OFFICE VISIT (OUTPATIENT)
Dept: PRIMARY CARE | Facility: CLINIC | Age: 25
End: 2025-09-02
Payer: COMMERCIAL

## 2025-09-02 VITALS
OXYGEN SATURATION: 98 % | TEMPERATURE: 96.6 F | SYSTOLIC BLOOD PRESSURE: 130 MMHG | DIASTOLIC BLOOD PRESSURE: 84 MMHG | HEIGHT: 66 IN | WEIGHT: 201 LBS | BODY MASS INDEX: 32.3 KG/M2 | HEART RATE: 95 BPM

## 2025-09-02 DIAGNOSIS — M54.41 ACUTE LOW BACK PAIN WITH BILATERAL SCIATICA, UNSPECIFIED BACK PAIN LATERALITY: ICD-10-CM

## 2025-09-02 DIAGNOSIS — M54.42 ACUTE LOW BACK PAIN WITH BILATERAL SCIATICA, UNSPECIFIED BACK PAIN LATERALITY: ICD-10-CM

## 2025-09-02 DIAGNOSIS — Z76.89 ENCOUNTER TO ESTABLISH CARE WITH NEW PROVIDER: Primary | ICD-10-CM

## 2025-09-02 PROCEDURE — 99213 OFFICE O/P EST LOW 20 MIN: CPT | Performed by: NURSE PRACTITIONER

## 2025-09-02 PROCEDURE — 3008F BODY MASS INDEX DOCD: CPT | Performed by: NURSE PRACTITIONER

## 2025-09-02 RX ORDER — METHYLPREDNISOLONE 4 MG/1
TABLET ORAL
Qty: 21 TABLET | Refills: 0 | Status: SHIPPED | OUTPATIENT
Start: 2025-09-02 | End: 2025-09-08

## 2025-09-02 RX ORDER — METHOCARBAMOL 500 MG/1
500 TABLET, FILM COATED ORAL 4 TIMES DAILY PRN
Qty: 40 TABLET | Refills: 1 | Status: SHIPPED | OUTPATIENT
Start: 2025-09-02 | End: 2025-11-01

## 2025-09-02 RX ORDER — IBUPROFEN 100 MG/1
TABLET, CHEWABLE ORAL EVERY 8 HOURS PRN
COMMUNITY

## 2025-09-02 ASSESSMENT — PAIN SCALES - GENERAL: PAINLEVEL_OUTOF10: 9

## 2025-09-02 ASSESSMENT — ENCOUNTER SYMPTOMS
CARDIOVASCULAR NEGATIVE: 1
GASTROINTESTINAL NEGATIVE: 1
NUMBNESS: 1
MYALGIAS: 1
BACK PAIN: 1
WEAKNESS: 0
LIGHT-HEADEDNESS: 0
RESPIRATORY NEGATIVE: 1
CONSTITUTIONAL NEGATIVE: 1

## 2025-09-02 ASSESSMENT — PATIENT HEALTH QUESTIONNAIRE - PHQ9
2. FEELING DOWN, DEPRESSED OR HOPELESS: NOT AT ALL
1. LITTLE INTEREST OR PLEASURE IN DOING THINGS: NOT AT ALL
SUM OF ALL RESPONSES TO PHQ9 QUESTIONS 1 AND 2: 0